# Patient Record
Sex: FEMALE | Race: WHITE
[De-identification: names, ages, dates, MRNs, and addresses within clinical notes are randomized per-mention and may not be internally consistent; named-entity substitution may affect disease eponyms.]

---

## 2018-04-25 ENCOUNTER — HOSPITAL ENCOUNTER (EMERGENCY)
Dept: HOSPITAL 45 - C.EDB | Age: 28
Discharge: HOME | End: 2018-04-25
Payer: OTHER GOVERNMENT

## 2018-04-25 VITALS
BODY MASS INDEX: 23.67 KG/M2 | HEIGHT: 70 IN | WEIGHT: 165.35 LBS | HEIGHT: 70 IN | BODY MASS INDEX: 23.67 KG/M2 | WEIGHT: 165.35 LBS

## 2018-04-25 VITALS — HEART RATE: 71 BPM | SYSTOLIC BLOOD PRESSURE: 105 MMHG | OXYGEN SATURATION: 100 % | DIASTOLIC BLOOD PRESSURE: 59 MMHG

## 2018-04-25 VITALS — TEMPERATURE: 98.42 F

## 2018-04-25 DIAGNOSIS — O20.8: Primary | ICD-10-CM

## 2018-04-25 DIAGNOSIS — Z3A.01: ICD-10-CM

## 2018-04-25 DIAGNOSIS — O99.89: ICD-10-CM

## 2018-04-25 DIAGNOSIS — R10.30: ICD-10-CM

## 2018-04-25 DIAGNOSIS — Z87.59: ICD-10-CM

## 2018-04-25 LAB
BASOPHILS # BLD: 0.01 K/UL (ref 0–0.2)
BASOPHILS NFR BLD: 0.2 %
BUN SERPL-MCNC: 9 MG/DL (ref 7–18)
CALCIUM SERPL-MCNC: 8.7 MG/DL (ref 8.5–10.1)
CO2 SERPL-SCNC: 27 MMOL/L (ref 21–32)
CREAT SERPL-MCNC: 0.62 MG/DL (ref 0.6–1.2)
EOS ABS #: 0.12 K/UL (ref 0–0.5)
EOSINOPHIL NFR BLD AUTO: 201 K/UL (ref 130–400)
GLUCOSE SERPL-MCNC: 93 MG/DL (ref 70–99)
HCT VFR BLD CALC: 36.8 % (ref 37–47)
HGB BLD-MCNC: 13.1 G/DL (ref 12–16)
IG#: 0.01 K/UL (ref 0–0.02)
IMM GRANULOCYTES NFR BLD AUTO: 28.4 %
LYMPHOCYTES # BLD: 1.68 K/UL (ref 1.2–3.4)
MCH RBC QN AUTO: 30.8 PG (ref 25–34)
MCHC RBC AUTO-ENTMCNC: 35.6 G/DL (ref 32–36)
MCV RBC AUTO: 86.6 FL (ref 80–100)
MONO ABS #: 0.6 K/UL (ref 0.11–0.59)
MONOCYTES NFR BLD: 10.1 %
NEUT ABS #: 3.5 K/UL (ref 1.4–6.5)
NEUTROPHILS # BLD AUTO: 2 %
NEUTROPHILS NFR BLD AUTO: 59.1 %
PMV BLD AUTO: 9.1 FL (ref 7.4–10.4)
POTASSIUM SERPL-SCNC: 3.5 MMOL/L (ref 3.5–5.1)
RED CELL DISTRIBUTION WIDTH CV: 12.2 % (ref 11.5–14.5)
RED CELL DISTRIBUTION WIDTH SD: 38.8 FL (ref 36.4–46.3)
SODIUM SERPL-SCNC: 138 MMOL/L (ref 136–145)
WBC # BLD AUTO: 5.92 K/UL (ref 4.8–10.8)

## 2018-04-25 NOTE — EMERGENCY ROOM VISIT NOTE
History


First contact with patient:  14:34


Chief Complaint:  ED VAG BLEEDING


Stated Complaint:  PAIN, BLEEDING, 6 WEEKS PREGNANT





History of Present Illness


The patient is a 27 year old female who presents to the Emergency Room with 

complaints of vaginal bleeding and severe lower abdominal pain for the last 

hour.  The patient reports being 6 weeks pregnant.  She is  with 1 

spontaneous miscarriage 3 years ago.  Patient also reports a mild headache.  

She has not taken anything for pain.  She reports her blood type is B+.  She 

has blood through 1 pad per hour.  No heavy clotting.





Review of Systems


10 system review performed and  negative unless noted in HPI or below





Past Medical/Surgical History


Otherwise healthy





Social History


Smoking Status:  Never Smoker


Marital Status:  





Current/Historical Medications


No Active Prescriptions or Reported Meds





Physical Exam


Vital Signs











  Date Time  Temp Pulse Resp B/P (MAP) Pulse Ox O2 Delivery O2 Flow Rate FiO2


 


18 18:26  71 18 105/59 100   


 


18 18:00  71 18 105/59 100 Room Air  


 


18 15:44  72 18 114/63 100 Room Air  


 


18 14:31 36.9 84 20 124/77 98 Room Air  











Physical Exam


VITALS: Vitals are noted on the nurse's note and reviewed by myself.  Vital 

signs stable.


GENERAL: 27-year-old female, in obvious discomfort,,.


SKIN: The skin was without rashes, erythema, edema, or bruising. .


HEAD: Normocephalic atraumatic.  


EYES:  Conjunctivae without injection, sclerae without icterus.  Extraocular 

movements intact.  


MOUTH: Mucous membranes moist.  Tonsils are not enlarged.  Pharynx without 

erythema or exudate.  Uvula midline.  Airway patent.  Tongue does not deviate.  


NECK: Supple without nuchal rigidity.  No lymphadenopathy. Cervical spine is 

nontender.  No JVD.


HEART: Regular rate and rhythm without murmurs gallops or rubs.


LUNGS: Clear to auscultation bilaterally without wheezes, rales or rhonchi.   

No accessory muscle use.


ABDOMEN: Positive bowel sounds x 4.Soft, tenderness to palpation in the left 

lower quadrant and suprapubic region, positive guarding


MUSCULOSKELETAL: No muscle atrophy, erythema, or edema noted. Strength 5/5 

throughout.


NEURO: Patient was alert and oriented to person place and time.  Normal 

sensation to touch. No focal neurological deficits.





Medical Decision & Procedures


ER Provider


Diagnostic Interpretation:


Transvaginal ultrasound


IMPRESSION:  





1. Intrauterine gestational sac which contains a fetal pole with estimated


gestational age of 5 weeks and 5 days. Probable visualization of fetal cardiac


activity although fetal heart rate could not be obtained due to early pregnancy.








2. Large suspected subchorionic hematoma, measuring 5.1 x 1.2 x 3.2 cm.


Short-term sonographic follow up is recommended.





3. 2.4 cm probable right ovarian corpus luteal cyst.














Electronically signed by:  George Barrow M.D.


2018 4:56 PM





Dictated Date/Time:  2018 4:51 PM





Laboratory Results


18 15:09








Red Blood Count 4.25, Mean Corpuscular Volume 86.6, Mean Corpuscular Hemoglobin 

30.8, Mean Corpuscular Hemoglobin Concent 35.6, Mean Platelet Volume 9.1, 

Neutrophils (%) (Auto) 59.1, Lymphocytes (%) (Auto) 28.4, Monocytes (%) (Auto) 

10.1, Eosinophils (%) (Auto) 2.0, Basophils (%) (Auto) 0.2, Neutrophils # (Auto

) 3.50, Lymphocytes # (Auto) 1.68, Monocytes # (Auto) 0.60, Eosinophils # (Auto

) 0.12, Basophils # (Auto) 0.01





18 15:09

















Test


  18


15:09


 


White Blood Count


  5.92 K/uL


(4.8-10.8)


 


Red Blood Count


  4.25 M/uL


(4.2-5.4)


 


Hemoglobin


  13.1 g/dL


(12.0-16.0)


 


Hematocrit 36.8 % (37-47) 


 


Mean Corpuscular Volume


  86.6 fL


()


 


Mean Corpuscular Hemoglobin


  30.8 pg


(25-34)


 


Mean Corpuscular Hemoglobin


Concent 35.6 g/dl


(32-36)


 


Platelet Count


  201 K/uL


(130-400)


 


Mean Platelet Volume


  9.1 fL


(7.4-10.4)


 


Neutrophils (%) (Auto) 59.1 % 


 


Lymphocytes (%) (Auto) 28.4 % 


 


Monocytes (%) (Auto) 10.1 % 


 


Eosinophils (%) (Auto) 2.0 % 


 


Basophils (%) (Auto) 0.2 % 


 


Neutrophils # (Auto)


  3.50 K/uL


(1.4-6.5)


 


Lymphocytes # (Auto)


  1.68 K/uL


(1.2-3.4)


 


Monocytes # (Auto)


  0.60 K/uL


(0.11-0.59)


 


Eosinophils # (Auto)


  0.12 K/uL


(0-0.5)


 


Basophils # (Auto)


  0.01 K/uL


(0-0.2)


 


RDW Standard Deviation


  38.8 fL


(36.4-46.3)


 


RDW Coefficient of Variation


  12.2 %


(11.5-14.5)


 


Immature Granulocyte % (Auto) 0.2 % 


 


Immature Granulocyte # (Auto)


  0.01 K/uL


(0.00-0.02)


 


Anion Gap


  4.0 mmol/L


(3-11)


 


Est Creatinine Clear Calc


Drug Dose 144.8 ml/min 


 


 


Estimated GFR (


American) 141.2 


 


 


Estimated GFR (Non-


American 121.9 


 


 


BUN/Creatinine Ratio 14.6 (10-20) 


 


Calcium Level


  8.7 mg/dl


(8.5-10.1)


 


Human Chorionic Gonadotropin,


Quant 26197 mIU/mL 


 











Medications Administered











 Medications


  (Trade)  Dose


 Ordered  Sig/Demario


 Route  Start Time


 Stop Time Status Last Admin


Dose Admin


 


 Ondansetron HCl


  (Zofran Inj)  4 mg  Q2H  PRN


 IV  18 15:00


 18 18:50 DC 18 15:43


4 MG


 


 Sodium Chloride  1,000 ml @ 


 999 mls/hr  Q1H1M ONCE


 IV  18 15:00


 18 16:00 DC 18 15:43


999 MLS/HR


 


 Oxycodone HCl


  (Roxicodone


 Immediate Rel 5MG


 Home Pack)  1 homepack  UD  ONCE


 PO  18 18:00


 18 18:01 DC 18 18:00


1 HOMEPACK


 


 Ondansetron HCl


  (ZOFRAN ODT 4MG


 Home Pack)  1 homepack  UD  ONCE


 PO  18 18:00


 18 18:01 DC 18 18:00


1 HOMEPACK











ED Course


Patient was seen and examined


Vital signs including  blood pressure were reviewed


medications list was verified with patient


Labs were obtained, and a saline lock was established


The patient declined pain medication.  She was given Zofran 4 mg IV for nausea.

  She was hydrated with 1 L of normal saline.


Upon reevaluation, the patient was still complaining of pain.  She was offered 

Tylenol, and declined.


The case was discussed with my supervising physician and additionally Dr. Renee 

from OB/GYN.  We discussed the case.


The patient was reassessed.  We discussed her workup at length.  She voiced 

understanding, was comfortable being discharged home.


She was given a home pack of oxycodone


I reviewed discharge instructions the patient.  They voiced understanding and 

had no further questions.





Medical Decision


Differential diagnosis: Spontaneous miscarriage, incomplete miscarriage, 

retained products, infection, hemorrhage, ectopic pregnancy





This patient is a 27-year-old female that presents to the emergency department 

complaining of lower abdominal pain and vaginal bleeding.  She is 6 weeks 

pregnant.  On exam, she was fairly tender particularly in the lower abdomen.  

Her workup reveals an hCG quant of 13,000.  Her ultrasound shows an 

intrauterine gestational sac with a fetal pole.  There is a subchorionic 

hematoma present.  This was discussed at length with OB/GYN.  They recommended 

close follow-up and repeat ultrasound.  The patient blood type is B+.  There is 

no need for RhoGam.  This was discussed with the patient.  Patient was in a 

significant amount of pain.  I recommended Tylenol every 6 hours as needed.  

For severe pain, she will take oxycodone 2.5 mg.  She was provided a home pack.

  She was cautioned on symptoms for which to return to the ER.





This chart was completed in part utilizing Dragon Speech Voice Recognition 

software. Attempts were made to minimize the grammatical errors, random word 

insertions, pronoun errors and incomplete sentences. Any formal questions or 

concerns about the content, text or information contained within the body of 

this dictation should be directly addressed to the provider for clarification.





Medication Reconcilliation


Current Medication List:  was personally reviewed by me





Blood Pressure Screening


Patient's blood pressure:  Normal blood pressure





Consults


Consulting Physician:  Dr. Renee





Impression





 Primary Impression:  


 Subchorionic hematoma





Departure Information


Dispostion


Home / Self-Care





Condition


FAIR





Prescriptions





No Active Prescriptions or Reported Meds





Referrals


Pretty De Jesus D.O. (PCP)








Girma Martin MD





Patient Instructions


[facility]





Additional Instructions





You have been evaluated in the emergency department for abdominal pain and 

vaginal bleeding.  This is likely due to a subchorionic bleed/hematoma in the 

uterus.





please use pads.  Nothing in the vagina.  Pelvic rest.  No sexual activity.  





You may continue to have Tylenol 500 mg every 6 hours as needed for pain.  If 

the pain is severe and the Tylenol is not working, please take oxycodone one 

half tab every 6 hours as needed





Please take Zofran 1 tab every 6 hours as needed for nausea





please call the OB/GYN doctor in the morning for a follow-up appointment





Please do not hesitate to return to the emergency department with any new, 

worsening or concerning symptoms; especially, bleeding greater than 2 pads per 

hour, uncontrolled pain, lightheadedness difficulty breathing or pain in your 

chest

## 2018-04-25 NOTE — DIAGNOSTIC IMAGING REPORT
FETAL ULTRASOUND



CLINICAL HISTORY: 6 weeks pregnant with pain and bleeding.    



COMPARISON STUDY:  No previous studies for comparison.



TECHNIQUE: Transabdominal and transvaginal sonography of the pelvis was

performed.



FINDINGS: Note is made of an intrauterine gestational sac with a mean sac

diameter of 1.46 cm which corresponds to an estimated gestational age of 5 weeks

and 5 days. This contains a yolk sac that measures 0.17 cm. In addition, there

is a suspected fetal pole with a crown-rump length of 0.28 cm which corresponds

to an estimated gestational age of 5 weeks and 6 days. There is probable fetal

cardiac activity although fetal heart rate could not be assessed on this exam,

likely due to an early pregnancy. Of note, there was a 5.1 x 1.9 x 3.2 cm

adjacent hypoechoic avascular abnormality which suggests a large subchorionic

hematoma. The left ovary was normal. The right ovary contained a 2.4 cm probable

corpus luteal cyst.



IMPRESSION:  



1. Intrauterine gestational sac which contains a fetal pole with estimated

gestational age of 5 weeks and 5 days. Probable visualization of fetal cardiac

activity although fetal heart rate could not be obtained due to early pregnancy.





2. Large suspected subchorionic hematoma, measuring 5.1 x 1.2 x 3.2 cm.

Short-term sonographic follow up is recommended.



3. 2.4 cm probable right ovarian corpus luteal cyst.









Electronically signed by:  George Barrow M.D.

4/25/2018 4:56 PM



Dictated Date/Time:  4/25/2018 4:51 PM